# Patient Record
Sex: MALE | Race: WHITE | NOT HISPANIC OR LATINO | Employment: FULL TIME | URBAN - METROPOLITAN AREA
[De-identification: names, ages, dates, MRNs, and addresses within clinical notes are randomized per-mention and may not be internally consistent; named-entity substitution may affect disease eponyms.]

---

## 2017-03-30 ENCOUNTER — OFFICE VISIT (OUTPATIENT)
Dept: URGENT CARE | Facility: CLINIC | Age: 22
End: 2017-03-30
Payer: COMMERCIAL

## 2017-03-30 VITALS
HEIGHT: 68 IN | RESPIRATION RATE: 16 BRPM | SYSTOLIC BLOOD PRESSURE: 118 MMHG | OXYGEN SATURATION: 97 % | HEART RATE: 74 BPM | WEIGHT: 166 LBS | DIASTOLIC BLOOD PRESSURE: 76 MMHG | TEMPERATURE: 99 F | BODY MASS INDEX: 25.16 KG/M2

## 2017-03-30 DIAGNOSIS — H10.33 ACUTE BACTERIAL CONJUNCTIVITIS OF BOTH EYES: ICD-10-CM

## 2017-03-30 PROCEDURE — 99214 OFFICE O/P EST MOD 30 MIN: CPT | Performed by: PHYSICIAN ASSISTANT

## 2017-03-30 RX ORDER — POLYMYXIN B SULFATE AND TRIMETHOPRIM 1; 10000 MG/ML; [USP'U]/ML
1 SOLUTION OPHTHALMIC EVERY 4 HOURS
Qty: 10 ML | Refills: 0 | Status: SHIPPED | OUTPATIENT
Start: 2017-03-30 | End: 2017-04-09

## 2017-03-30 RX ORDER — ERYTHROMYCIN 5 MG/G
OINTMENT OPHTHALMIC
Qty: 1 TUBE | Refills: 0 | Status: SHIPPED | OUTPATIENT
Start: 2017-03-30

## 2017-03-30 ASSESSMENT — ENCOUNTER SYMPTOMS
EYE PAIN: 0
VOMITING: 0
WHEEZING: 0
SPUTUM PRODUCTION: 0
DIARRHEA: 0
PHOTOPHOBIA: 0
SHORTNESS OF BREATH: 0
SORE THROAT: 1
DOUBLE VISION: 0
ABDOMINAL PAIN: 0
BLURRED VISION: 0
CHILLS: 0
EYE DISCHARGE: 1
EYE REDNESS: 1
NAUSEA: 0
FEVER: 0
COUGH: 1

## 2017-03-30 NOTE — Clinical Note
March 30, 2017       Patient: Fanny Delcid   YOB: 1995   Date of Visit: 3/30/2017         To Whom It May Concern:    It is my medical opinion that Fanny Delcid should be excused from work & school for today, tomorrow and Saturday due to illness .    If you have any questions or concerns, please don't hesitate to call [phone]          Sincerely,          Satish Holden PA-C  Electronically Signed

## 2017-03-30 NOTE — PROGRESS NOTES
Subjective:      Fanny Delcid is a 21 y.o. male who presents with Nasal Congestion            Other  Associated symptoms include congestion, coughing and a sore throat ( very mild, in am). Pertinent negatives include no abdominal pain, chills, fever, nausea, rash or vomiting.     Last few days of redness of bilat eyes, notes discharge from eyes, crusting over in am, mild cough, mild sorethroat, denies ear pain, c/o sinus pressure, denies chest congestion. Denies visual changes. Denies nausea/vmoiting/abdpain/diarrhea/rash. PMH of seasonal allerg - tried zyrtec, allegra D. Denies pain to eyes, c/o itch. GF had eye drops from pink eye w/ some relief.   Review of Systems   Constitutional: Negative for fever and chills.   HENT: Positive for congestion and sore throat ( very mild, in am). Negative for ear pain.    Eyes: Positive for discharge and redness. Negative for blurred vision, double vision, photophobia and pain.   Respiratory: Positive for cough. Negative for sputum production, shortness of breath and wheezing.    Gastrointestinal: Negative for nausea, vomiting, abdominal pain and diarrhea.   Skin: Negative for rash.   Endo/Heme/Allergies: Positive for environmental allergies.       PMH:  has no past medical history on file.  MEDS:   Current outpatient prescriptions:   •  polymixin-trimethoprim (POLYTRIM) 13240-0.1 UNIT/ML-% Solution, Place 1 Drop in both eyes every 4 hours for 10 days., Disp: 10 mL, Rfl: 0  •  Pseudoephedrine-APAP-DM (DAYQUIL PO), Take  by mouth., Disp: , Rfl:   •  ibuprofen (MOTRIN) 400 MG Tab, Take 400 mg by mouth every 6 hours as needed., Disp: , Rfl:   •  azithromycin (ZITHROMAX) 250 MG Tab, z-diomedes; U.D., Disp: 6 Tab, Rfl: 1  ALLERGIES:   Allergies   Allergen Reactions   • Penicillins      SURGHX: No past surgical history on file.  SOCHX:  reports that he has never smoked. He has never used smokeless tobacco. He reports that he drinks about 1.2 oz of alcohol per week. He reports that  "he does not use illicit drugs.  FH: Family history was reviewed, no pertinent findings to report    I have worn a mask for the entire encounter with this patient.      Objective:     /76 mmHg  Pulse 74  Temp(Src) 37.2 °C (99 °F)  Resp 16  Ht 1.727 m (5' 8\")  Wt 75.297 kg (166 lb)  BMI 25.25 kg/m2  SpO2 97%     Physical Exam   Constitutional: He is oriented to person, place, and time. He appears well-developed and well-nourished. No distress.   HENT:   Head: Normocephalic and atraumatic.   Right Ear: External ear normal.   Left Ear: External ear normal.   Nose: Nose normal.   Mouth/Throat: No oropharyngeal exudate.   Eyes: EOM and lids are normal. Pupils are equal, round, and reactive to light. Right eye exhibits exudate. Right eye exhibits no chemosis, no discharge and no hordeolum. No foreign body present in the right eye. Left eye exhibits exudate. Left eye exhibits no chemosis, no discharge and no hordeolum. No foreign body present in the left eye. Right conjunctiva is injected. Right conjunctiva has no hemorrhage. Left conjunctiva is injected. Left conjunctiva has no hemorrhage. No scleral icterus.   Neck: Neck supple.   Pulmonary/Chest: Effort normal and breath sounds normal. No respiratory distress. He has no decreased breath sounds. He has no wheezes. He has no rhonchi. He has no rales.   Musculoskeletal: Normal range of motion.   Lymphadenopathy:     He has cervical adenopathy.   Neurological: He is alert and oriented to person, place, and time. Coordination normal.   Skin: Skin is warm and dry. He is not diaphoretic. No pallor.   Psychiatric: He has a normal mood and affect.   Nursing note and vitals reviewed.              Assessment/Plan:   1. Acute bacterial conjunctivitis of both eyes  Supportive care is reviewed with patient/caregiver - recommend to push PO fluids and electrolytes, Nsaids/tylenol, netti pot/saline irrig, humidifier in home, flonase, ponaris, antihistamines    Full course of " eye drops abx for conjunctivitis, discuss risk of contagion and typical course and red flags  Return to clinic with lack of resolution or progression of symptoms.  ER precautions with any worsening symptoms are reviewed with patient/caregiver and they do express understanding  - polymixin-trimethoprim (POLYTRIM) 38268-4.1 UNIT/ML-% Solution; Place 1 Drop in both eyes every 4 hours for 10 days.  Dispense: 10 mL; Refill: 0

## 2017-03-30 NOTE — MR AVS SNAPSHOT
"        Fanny Delcid   3/30/2017 10:40 AM   Office Visit   MRN: [id number]    Department:  [facility]   Dept Phone:  [phone]    Description:  Male : 1995   Provider:  Satish Holden PA-C           Reason for Visit     Nasal Congestion x since yesterday having sinus congestion and drainage, head pressure, discharge from L eye, irritated throat from drainage and cough       Allergies as of 3/30/2017     Allergen Noted Reactions    Penicillins 2016         You were diagnosed with     Acute bacterial conjunctivitis of both eyes   [[id number]]         Vital Signs     Blood Pressure Pulse Temperature Respirations Height Weight    118/76 mmHg 74 37.2 °C (99 °F) 16 1.727 m (5' 8\") 75.297 kg (166 lb)    Body Mass Index Oxygen Saturation Smoking Status             25.25 kg/m2 97% Never Smoker          Basic Information     Date Of Birth Sex Race Ethnicity Preferred Language    1995 Male White Non- English      Health Maintenance        Date Due Completion Dates    IMM HEP B VACCINE (1 of 3 - Primary Series) 1994 ---    IMM HEP A VACCINE (1 of 2 - Standard Series) 1995 ---    IMM HPV VACCINE (1 of 3 - Male 3 Dose Series) 2005 ---    IMM VARICELLA (CHICKENPOX) VACCINE (1 of 2 - 2 Dose Adolescent Series) 2007 ---    IMM DTaP/Tdap/Td Vaccine (1 - Tdap) 2013 ---    IMM INFLUENZA (1) 2016 ---            Current Immunizations     No immunizations on file.      Below and/or attached are the medications your provider expects you to take. Review all of your home medications and newly ordered medications with your provider and/or pharmacist. Follow medication instructions as directed by your provider and/or pharmacist. Please keep your medication list with you and share with your provider. Update the information when medications are discontinued, doses are changed, or new medications (including over-the-counter products) are added; and carry medication " information at all times in the event of emergency situations     Allergies:  PENICILLINS - (reactions not documented)               Medications  Valid as of: March 30, 2017 - 11:03 AM    Generic Name Brand Name Tablet Size Instructions for use    Azithromycin (Tab) ZITHROMAX 250 MG z-diomedes; U.D.        Ibuprofen (Tab) MOTRIN 400 MG Take 400 mg by mouth every 6 hours as needed.        Polymyxin B-Trimethoprim (Solution) POLYTRIM 83087-0.1 UNIT/ML-% Place 1 Drop in both eyes every 4 hours for 10 days.        Pseudoephedrine-APAP-DM   Take  by mouth.        .                 Medicines prescribed today were sent to:     [id number] - [place] - [address]    [address] [place] [address]    Phone: [phone] Fax: [phone]    Open 24 Hours?: No      Medication refill instructions:       If your prescription bottle indicates you have medication refills left, it is not necessary to call your provider’s office. Please contact your pharmacy and they will refill your medication.    If your prescription bottle indicates you do not have any refills left, you may request refills at any time through one of the following ways: The online [employer] system (except Urgent Care), by calling your provider’s office, or by asking your pharmacy to contact your provider’s office with a refill request. Medication refills are processed only during regular business hours and may not be available until the next business day. Your provider may request additional information or to have a follow-up visit with you prior to refilling your medication.   *Please Note: Medication refills are assigned a new Rx number when refilled electronically. Your pharmacy may indicate that no refills were authorized even though a new prescription for the same medication is available at the pharmacy. Please request the medicine by name with the pharmacy before contacting your provider for a refill.           [employer] Access Code: [email or web address]  Expires:  4/29/2017 11:03 AM    [employer]  A secure, online tool to manage your health information     [employer]’s NewACT® is a secure, online tool that connects you to your personalized health information from the privacy of your home -- day or night - making it very easy for you to manage your healthcare. Once the activation process is completed, you can even access your medical information using the NewACT zofia, which is available for free in the Apple Zofia store or Google Play store.     NewACT provides the following levels of access (as shown below):   My Chart Features   [facility] Doctor [facility]  Specialists [facility]  Urgent  Care Non-[facility]  Primary Care  Doctor   Email your healthcare team securely and privately 24/7 X X X    Manage appointments: schedule your next appointment; view details of past/upcoming appointments X      Request prescription refills. X      View recent personal medical records, including lab and immunizations X X X X   View health record, including health history, allergies, medications X X X X   Read reports about your outpatient visits, procedures, consult and ER notes X X X X   See your discharge summary, which is a recap of your hospital and/or ER visit that includes your diagnosis, lab results, and care plan. X X       How to register for NewACT:  1. Go to  [email or web address].  2. Click on the Sign Up Now box, which takes you to the New Member Sign Up page. You will need to provide the following information:  a. Enter your NewACT Access Code exactly as it appears at the top of this page. (You will not need to use this code after you’ve completed the sign-up process. If you do not sign up before the expiration date, you must request a new code.)   b. Enter your date of birth.   c. Enter your home email address.   d. Click Submit, and follow the next screen’s instructions.  3. Create a NewACT ID. This will be your NewACT login ID and cannot be changed, so think of one  that is secure and easy to remember.  4. Create a [employer] password. You can change your password at any time.  5. Enter your Password Reset Question and Answer. This can be used at a later time if you forget your password.   6. Enter your e-mail address. This allows you to receive e-mail notifications when new information is available in [employer].  7. Click Sign Up. You can now view your health information.    For assistance activating your [employer] account, call [phone]

## 2018-06-04 ENCOUNTER — OCCUPATIONAL MEDICINE (OUTPATIENT)
Dept: URGENT CARE | Facility: CLINIC | Age: 23
End: 2018-06-04
Payer: COMMERCIAL

## 2018-06-04 VITALS
OXYGEN SATURATION: 98 % | TEMPERATURE: 99 F | WEIGHT: 172.4 LBS | BODY MASS INDEX: 26.13 KG/M2 | HEART RATE: 74 BPM | SYSTOLIC BLOOD PRESSURE: 110 MMHG | DIASTOLIC BLOOD PRESSURE: 80 MMHG | HEIGHT: 68 IN

## 2018-06-04 DIAGNOSIS — S46.912A LEFT SHOULDER STRAIN, INITIAL ENCOUNTER: ICD-10-CM

## 2018-06-04 PROCEDURE — 99213 OFFICE O/P EST LOW 20 MIN: CPT | Mod: 29 | Performed by: NURSE PRACTITIONER

## 2018-06-04 NOTE — PROGRESS NOTES
"Subjective:      Fanny Delcid is a 22 y.o. male who presents with Shoulder Injury (WC L shoulder pain,was ;ifting a heavy table at work now shoulder feels tight\")      DOI: 5/24/18. Patient is 22 year old male with left shoulder injury after lifting a heavy table at work. Since that time injury has worsened. He has 3/10 pain at rest and 8/10 pain when active. He is right hand dominant. No distal paresthesia. He has both pain and tightness. He has history of broken clavicle on affected side 2 years ago. He has been taking aleve intermittently. He denies any second job.     HPI    ROS       Objective:     /80   Pulse 74   Temp 37.2 °C (99 °F)   Ht 1.727 m (5' 8\")   Wt 78.2 kg (172 lb 6.4 oz)   SpO2 98%   BMI 26.21 kg/m²      Physical Exam   Constitutional: He is oriented to person, place, and time. He appears well-developed and well-nourished.   Cardiovascular: Normal rate, regular rhythm and normal heart sounds.    No murmur heard.  Pulmonary/Chest: Effort normal and breath sounds normal.   Musculoskeletal: Normal range of motion.        Left shoulder: He exhibits tenderness and pain. He exhibits normal range of motion, no spasm and normal strength.        Arms:  Neurological: He is alert and oriented to person, place, and time.   Skin: Skin is warm and dry.   Psychiatric: He has a normal mood and affect. His behavior is normal. Thought content normal.   Nursing note and vitals reviewed.              Assessment/Plan:     1. Left shoulder strain, initial encounter       Dedicated course of nsaids.  Icing to the area.  Exercise handout via AVS  Follow up Friday.      "

## 2018-06-04 NOTE — LETTER
[facility]  [address]  [place] [address]  Phone:  [phone] - Fax:  [phone]   [facility] Progress Report and Disability Certification  Date of Service: 6/4/2018   No Show:  No  Date / Time of Next Visit:     Claim Information   Patient Name: Fanny Delcid  Claim Number:     Employer:  [employer] Date of Injury: 5/24/2018     Insurer / TPA: Employers Insurance  ID / SSN:     Occupation: /   Diagnosis: The encounter diagnosis was Left shoulder strain, initial encounter.    Medical Information   Related to Industrial Injury? Yes    Subjective Complaints:  DOI: 5/24/18. Patient is 22 year old male with left shoulder injury after lifting a heavy table at work. Since that time injury has worsened. He has 3/10 pain at rest and 8/10 pain when active. He is right hand dominant. No distal paresthesia. He has both pain and tightness. He has history of broken clavicle on affected side 2 years ago. He has been taking aleve intermittently. He denies any second job.   Objective Findings: Physical Exam   Constitutional: He is oriented to person, place, and time. He appears well-developed and well-nourished.   Cardiovascular: Normal rate, regular rhythm and normal heart sounds.    No murmur heard.  Pulmonary/Chest: Effort normal and breath sounds normal.   Musculoskeletal: Normal range of motion.        Left shoulder: He exhibits tenderness and pain. He exhibits normal range of motion, no spasm and normal strength.   Neurological: He is alert and oriented to person, place, and time.   Skin: Skin is warm and dry.   Psychiatric: He has a normal mood and affect. His behavior is normal. Thought content normal.   Nursing note and vitals reviewed.     Pre-Existing Condition(s):     Assessment:   Initial Visit    Status: Additional Care Required  Permanent Disability:No    Plan:      Diagnostics:      Comments:          Disability Information   Status: Released to Restricted Duty    From:     Through:   Restrictions are:     Physical Restrictions   Sitting:    Standing:    Stooping:    Bending:      Squatting:    Walking:    Climbing:    Pushing:      Pulling:    Other:    Reaching Above Shoulder (L): < or = to 4 hrs/day Reaching Above Shoulder (R):       Reaching Below Shoulder (L):    Reaching Below Shoulder (R):      Not to exceed Weight Limits   Carrying(hrs):   Weight Limit(lb): < or = to 10 pounds Lifting(hrs):   Weight  Limit(lb): < or = to 10 pounds   Comments: Follow up on Friday.    Repetitive Actions   Hands: i.e. Fine Manipulations from Grasping:     Feet: i.e. Operating Foot Controls:     Driving / Operate Machinery:     Physician Name: ADA Dudley Physician Signature: AMALIA Wood e-Signature: Dr. Zoran Livingston, Medical Director   Clinic Name / Location: [facility]  [address] [place] [id number] Clinic Phone Number: Dept: [phone]   Appointment Time: 12:15 Pm Visit Start Time: 12:34 PM   Check-In Time:  12:29 Pm Visit Discharge Time: 12:54 PM   Original-Treating Physician or Chiropractor    Page 2-Insurer/TPA    Page 3-Employer    Page 4-Employee

## 2018-06-04 NOTE — PATIENT INSTRUCTIONS
Shoulder Exercises  Ask your health care provider which exercises are safe for you. Do exercises exactly as told by your health care provider and adjust them as directed. It is normal to feel mild stretching, pulling, tightness, or discomfort as you do these exercises, but you should stop right away if you feel sudden pain or your pain gets worse. Do not begin these exercises until told by your health care provider.  RANGE OF MOTION EXERCISES   These exercises warm up your muscles and joints and improve the movement and flexibility of your shoulder. These exercises also help to relieve pain, numbness, and tingling. These exercises involve stretching your injured shoulder directly.  Exercise A: Pendulum   1. Stand near a wall or a surface that you can hold onto for balance.  2. Bend at the waist and let your left / right arm hang straight down. Use your other arm to support you. Keep your back straight and do not lock your knees.  3. Relax your left / right arm and shoulder muscles, and move your hips and your trunk so your left / right arm swings freely. Your arm should swing because of the motion of your body, not because you are using your arm or shoulder muscles.  4. Keep moving your body so your arm swings in the following directions, as told by your health care provider:  ¨ Side to side.  ¨ Forward and backward.  ¨ In clockwise and counterclockwise circles.  5. Continue each motion for __________ seconds, or for as long as told by your health care provider.  6. Slowly return to the starting position.  Repeat __________ times. Complete this exercise __________ times a day.  Exercise B: Flexion, Standing   1. Stand and hold a broomstick, a cane, or a similar object. Place your hands a little more than shoulder-width apart on the object. Your left / right hand should be palm-up, and your other hand should be palm-down.  2. Keep your elbow straight and keep your shoulder muscles relaxed. Push the stick down with  your healthy arm to raise your left / right arm in front of your body, and then over your head until you feel a stretch in your shoulder.  ¨ Avoid shrugging your shoulder while you raise your arm. Keep your shoulder blade tucked down toward the middle of your back.  3. Hold for __________ seconds.  4. Slowly return to the starting position.  Repeat __________ times. Complete this exercise __________ times a day.  Exercise C: Abduction, Standing  1. Stand and hold a broomstick, a cane, or a similar object. Place your hands a little more than shoulder-width apart on the object. Your left / right hand should be palm-up, and your other hand should be palm-down.  2. While keeping your elbow straight and your shoulder muscles relaxed, push the stick across your body toward your left / right side. Raise your left / right arm to the side of your body and then over your head until you feel a stretch in your shoulder.  ¨ Do not raise your arm above shoulder height, unless your health care provider tells you to do that.  ¨ Avoid shrugging your shoulder while you raise your arm. Keep your shoulder blade tucked down toward the middle of your back.  3. Hold for __________ seconds.  4. Slowly return to the starting position.  Repeat __________ times. Complete this exercise __________ times a day.  Exercise D: Internal Rotation   1. Place your left / right hand behind your back, palm-up.  2. Use your other hand to dangle an exercise band, a towel, or a similar object over your shoulder. Grasp the band with your left / right hand so you are holding onto both ends.  3. Gently pull up on the band until you feel a stretch in the front of your left / right shoulder.  ¨ Avoid shrugging your shoulder while you raise your arm. Keep your shoulder blade tucked down toward the middle of your back.  4. Hold for __________ seconds.  5. Release the stretch by letting go of the band and lowering your hands.  Repeat __________ times. Complete this  exercise __________ times a day.  STRETCHING EXERCISES   These exercises warm up your muscles and joints and improve the movement and flexibility of your shoulder. These exercises also help to relieve pain, numbness, and tingling. These exercises are done using your healthy shoulder to help stretch the muscles of your injured shoulder.  Exercise E: Corner Stretch (External Rotation and Abduction)   1.  a doorway with one of your feet slightly in front of the other. This is called a staggered stance. If you cannot reach your forearms to the door frame, stand facing a corner of a room.  2. Choose one of the following positions as told by your health care provider:  ¨ Place your hands and forearms on the door frame above your head.  ¨ Place your hands and forearms on the door frame at the height of your head.  ¨ Place your hands on the door frame at the height of your elbows.  3. Slowly move your weight onto your front foot until you feel a stretch across your chest and in the front of your shoulders. Keep your head and chest upright and keep your abdominal muscles tight.  4. Hold for __________ seconds.  5. To release the stretch, shift your weight to your back foot.  Repeat __________ times. Complete this stretch __________ times a day.  Exercise F: Extension, Standing  1. Stand and hold a broomstick, a cane, or a similar object behind your back.  ¨ Your hands should be a little wider than shoulder-width apart.  ¨ Your palms should face away from your back.  2. Keeping your elbows straight and keeping your shoulder muscles relaxed, move the stick away from your body until you feel a stretch in your shoulder.  ¨ Avoid shrugging your shoulders while you move the stick. Keep your shoulder blade tucked down toward the middle of your back.  3. Hold for __________ seconds.  4. Slowly return to the starting position.  Repeat __________ times. Complete this exercise __________ times a day.  STRENGTHENING EXERCISES      These exercises build strength and endurance in your shoulder. Endurance is the ability to use your muscles for a long time, even after they get tired.  Exercise G: External Rotation   1. Sit in a stable chair without armrests.  2. Secure an exercise band at elbow height on your left / right side.  3. Place a soft object, such as a folded towel or a small pillow, between your left / right upper arm and your body to move your elbow a few inches away (about 10 cm) from your side.  4. Hold the end of the band so it is tight and there is no slack.  5. Keeping your elbow pressed against the soft object, move your left / right forearm out, away from your abdomen. Keep your body steady so only your forearm moves.  6. Hold for __________ seconds.  7. Slowly return to the starting position.  Repeat __________ times. Complete this exercise __________ times a day.  Exercise H: Shoulder Abduction   1. Sit in a stable chair without armrests, or stand.  2. Hold a __________ weight in your left / right hand, or hold an exercise band with both hands.  3. Start with your arms straight down and your left / right palm facing in, toward your body.  4. Slowly lift your left / right hand out to your side. Do not lift your hand above shoulder height unless your health care provider tells you that this is safe.  ¨ Keep your arms straight.  ¨ Avoid shrugging your shoulder while you do this movement. Keep your shoulder blade tucked down toward the middle of your back.  5. Hold for __________ seconds.  6. Slowly lower your arm, and return to the starting position.  Repeat __________ times. Complete this exercise __________ times a day.  Exercise I: Shoulder Extension  1. Sit in a stable chair without armrests, or stand.  2. Secure an exercise band to a stable object in front of you where it is at shoulder height.  3. Hold one end of the exercise band in each hand. Your palms should face each other.  4. Straighten your elbows and lift your  "hands up to shoulder height.  5. Step back, away from the secured end of the exercise band, until the band is tight and there is no slack.  6. Squeeze your shoulder blades together as you pull your hands down to the sides of your thighs. Stop when your hands are straight down by your sides. Do not let your hands go behind your body.  7. Hold for __________ seconds.  8. Slowly return to the starting position.  Repeat __________ times. Complete this exercise __________ times a day.  Exercise J: Standing Shoulder Row  1. Sit in a stable chair without armrests, or stand.  2. Secure an exercise band to a stable object in front of you so it is at waist height.  3. Hold one end of the exercise band in each hand. Your palms should be in a thumbs-up position.  4. Bend each of your elbows to an \"L\" shape (about 90 degrees) and keep your upper arms at your sides.  5. Step back until the band is tight and there is no slack.  6. Slowly pull your elbows back behind you.  7. Hold for __________ seconds.  8. Slowly return to the starting position.  Repeat __________ times. Complete this exercise __________ times a day.  Exercise K: Shoulder Press-Ups   1. Sit in a stable chair that has armrests. Sit upright, with your feet flat on the floor.  2. Put your hands on the armrests so your elbows are bent and your fingers are pointing forward. Your hands should be about even with the sides of your body.  3. Push down on the armrests and use your arms to lift yourself off of the chair. Straighten your elbows and lift yourself up as much as you comfortably can.  ¨ Move your shoulder blades down, and avoid letting your shoulders move up toward your ears.  ¨ Keep your feet on the ground. As you get stronger, your feet should support less of your body weight as you lift yourself up.  4. Hold for __________ seconds.  5. Slowly lower yourself back into the chair.  Repeat __________ times. Complete this exercise __________ times a day.  Exercise " L: Wall Push-Ups   1. Stand so you are facing a stable wall. Your feet should be about one arm-length away from the wall.  2. Lean forward and place your palms on the wall at shoulder height.  3. Keep your feet flat on the floor as you bend your elbows and lean forward toward the wall.  4. Hold for __________ seconds.  5. Straighten your elbows to push yourself back to the starting position.  Repeat __________ times. Complete this exercise __________ times a day.  This information is not intended to replace advice given to you by your health care provider. Make sure you discuss any questions you have with your health care provider.  Document Released: 11/01/2006 Document Revised: 09/11/2017 Document Reviewed: 08/28/2016  Elsevier Interactive Patient Education © 2017 [employer]  n2

## 2018-06-04 NOTE — LETTER
"EMPLOYEE’S CLAIM FOR COMPENSATION/ REPORT OF INITIAL TREATMENT  FORM C-4    EMPLOYEE’S CLAIM - PROVIDE ALL INFORMATION REQUESTED   First Name  Fanny Last Name  Farhana Birthdate                    1995                Sex  male Claim Number   Home Address  [address] Age  22 y.o. Height  1.727 m (5' 8\") Weight  78.2 kg (172 lb 6.4 oz) [facility]     [facility] [place]  [address] Telephone  [phone] (home)    Mailing Address  [address] [facility] [place]  [address] Primary Language Spoken  English    Insurer   Third Party   Employers Insurance   Employee's Occupation (Job Title) When Injury or Occupational Disease Occurred  /     Employer's Name     Telephone  [phone]    Employer Address    City    State    Zip      Date of Injury  5/24/2018               Hour of Injury  11:30 AM Date Employer Notified  5/30/2018 Last Day of Work after Injury or Occupational Disease  6/4/2018 Supervisor to Whom Injury Reported  Radha Bee Mr. Card   Address or Location of Accident (if applicable)  [On delivery at  [employer] and store]   What were you doing at the time of accident? (if applicable)  Lifting table, driving forklift. lifting cahir    How did this injury or occupational disease occur? (Be specific an answer in detail. Use additional sheet if necessary)  at work, lifting   If you believe that you have an occupational disease, when did you first have knowledge of the disability and it relationship to your employment?  N/A Witnesses to the Accident  Jeramy E      Nature of Injury or Occupational Disease  Workers' Compensation  Part(s) of Body Injured or Affected  Shoulder (L), ,     I certify that the above is true and correct to the best of my knowledge and that I have provided this information in order to obtain the benefits of [place]’s [place] Insurance and " [place]  I hereby authorize any physician, chiropractor, surgeon, practitioner, or other person, any hospital, including [facility] or [facility], any medical service organization, any insurance company, or other institution or organization to release to each other, any medical or other information, including benefits paid or payable, pertinent to this injury or disease, except information relative to diagnosis, treatment and/or counseling for AIDS, psychological conditions, alcohol or controlled substances, for which I must give specific authorization.  A Photostat of this authorization shall be as valid as the original.     Date 6/4/2018   [facility]   Employee’s Signature   THIS REPORT MUST BE COMPLETED AND MAILED WITHIN 3 WORKING DAYS OF TREATMENT   Place  [facility]  Name of Facility  [facility]   Date  6/4/2018 Diagnosis  (S46.912A) Left shoulder strain, initial encounter Is there evidence the injured employee was under the influence of alcohol and/or another controlled substance at the time of accident?   Hour  12:34 PM Description of Injury or Disease  The encounter diagnosis was Left shoulder strain, initial encounter. No   Treatment  nsaids and icing.  Have you advised the patient to remain off work five days or more? No   X-Ray Findings      If Yes   From Date  To Date      From information given by the employee, together with medical evidence, can you directly connect this injury or occupational disease as job incurred?  Yes If No Full Duty  No Modified Duty  Yes   Is additional medical care by a physician indicated?  Yes If Modified Duty, Specify any Limitations / Restrictions  10 weight carry limit. No raising left arm above shoulder.   Do you know of any previous injury or disease contributing to this condition or occupational disease?                            No  "  Date  6/4/2018 Print Doctor’s Name ADA Dudley I certify the employer’s copy of  this form was mailed on:   Address  [address] Insurer’s Use Only     [place] Zip  [address]    Provider’s Tax ID Number  [id number] Telephone  Dept: [phone]        e-AMALIA Aguiar   e-Signature: Dr. Zoran Livingston, Medical Director Degree  APN        ORIGINAL-TREATING PHYSICIAN OR CHIROPRACTOR    PAGE 2-INSURER/TPA    PAGE 3-EMPLOYER    PAGE 4-EMPLOYEE             Form C-4 (rev10/07)              BRIEF DESCRIPTION OF RIGHTS AND BENEFITS  (Pursuant to [place] 616[place])    Notice of Injury or Occupational Disease (Incident Report Form C-1): If an injury or occupational disease (OD) arises out of and in the  course of employment, you must provide written notice to your employer as soon as practicable, but no later than 7 days after the accident or  OD. Your employer shall maintain a sufficient supply of the required forms.    Claim for Compensation (Form C-4): If medical treatment is sought, the form C-4 is available at the place of initial treatment. A completed  \"Claim for Compensation\" (Form C-4) must be filed within 90 days after an accident or OD. The treating physician or chiropractor must,  within 3 working days after treatment, complete and mail to the employer, the employer's insurer and third-party , the Claim for  Compensation.    Medical Treatment: If you require medical treatment for your on-the-job injury or OD, you may be required to select a physician or  chiropractor from a list provided by your workers’ compensation insurer, if it has contracted with an Organization for Managed Care (MCO) or  Preferred Provider Organization (PPO) or providers of health care. If your employer has not entered into a contract with an MCO or PPO, you  may select a physician or chiropractor from the Panel of Physicians and Chiropractors. Any medical costs related to your " industrial injury or  OD will be paid by your insurer.    Temporary Total Disability (TTD): If your doctor has certified that you are unable to work for a period of at least 5 consecutive days, or 5  cumulative days in a 20-day period, or places restrictions on you that your employer does not accommodate, you may be entitled to TTD  compensation.    Temporary Partial Disability (TPD): If the wage you receive upon reemployment is less than the compensation for TTD to which you are  entitled, the insurer may be required to pay you TPD compensation to make up the difference. TPD can only be paid for a maximum of 24  months.    Permanent Partial Disability (PPD): When your medical condition is stable and there is an indication of a PPD as a result of your injury or  OD, within 30 days, your insurer must arrange for an evaluation by a rating physician or chiropractor to determine the degree of your PPD. The  amount of your PPD award depends on the date of injury, the results of the PPD evaluation and your age and wage.    Permanent Total Disability (PTD): If you are medically certified by a treating physician or chiropractor as permanently and totally disabled  and have been granted a PTD status by your insurer, you are entitled to receive monthly benefits not to exceed 66 2/3% of your average  monthly wage. The amount of your PTD payments is subject to reduction if you previously received a PPD award.    Vocational Rehabilitation Services: You may be eligible for vocational rehabilitation services if you are unable to return to the job due to a  permanent physical impairment or permanent restrictions as a result of your injury or occupational disease.    Transportation and Per Dorothy Reimbursement: You may be eligible for travel expenses and per dorothy associated with medical treatment.    Reopening: You may be able to reopen your claim if your condition worsens after claim closure.    Appeal Process: If you disagree with a  written determination issued by the insurer or the insurer does not respond to your request, you may  appeal to the Department of Administration, , by following the instructions contained in your determination letter. You must  appeal the determination within 70 days from the date of the determination letter at [address], [address], [place], [place]  [address], or [address], [address], [place], [place] [address]. If you disagree with the  decision, you may appeal to the  Department of Administration, . You must file your appeal within 30 days from the date of the  decision  letter at [address], [address], [place], [place] [address], or [address], [address] [facility] [address], [place], [place] [address]. If you  disagree with a decision of an , you may file a petition for judicial review with the District Court. You must do so within 30  days of the Appeal Officer’s decision. You may be represented by an  at your own expense or you may contact the [facility] for possible  representation.    [place]  for Injured Workers (NAIW): If you disagree with a  decision, you may request that NAIW represent you  without charge at an  Hearing. For information regarding denial of benefits, you may contact the [facility] at: [address], [address], [place] [address], [phone], or [address], [address], [place] [address], [phone]    To File a Complaint with the Division: If you wish to file a complaint with the  of the Division of Industrial Relations (DIR),  please contact the Workers’ Compensation Section, [address], [address], [place], [place] [address], telephone [phone]) [phone], or  [address], [address], [place], [place] [address], telephone [phone].    For assistance with Workers’ Compensation Issues: you may  contact the Office of the Governor Consumer Health Assistance, [address] [place] [address], [address], [place], [place] [address], Toll Free 1-418.876.2825, Web site: http://govcha.[facility].nv.us, E-mail  [email or web address].[email or web address].                                                                                                                                                                                                                                   __________________________________________________________________                                                                   _________________                Employee Name / Signature                                                                                                                                                       Date                                                                                                                                                                                                     D-2 (rev. 10/07)

## 2018-06-08 ENCOUNTER — OCCUPATIONAL MEDICINE (OUTPATIENT)
Dept: URGENT CARE | Facility: CLINIC | Age: 23
End: 2018-06-08
Payer: COMMERCIAL

## 2018-06-08 VITALS
RESPIRATION RATE: 16 BRPM | TEMPERATURE: 98 F | WEIGHT: 174.16 LBS | SYSTOLIC BLOOD PRESSURE: 110 MMHG | DIASTOLIC BLOOD PRESSURE: 80 MMHG | BODY MASS INDEX: 26.4 KG/M2 | HEIGHT: 68 IN | HEART RATE: 68 BPM | OXYGEN SATURATION: 98 %

## 2018-06-08 DIAGNOSIS — S46.912D STRAIN OF LEFT SHOULDER, SUBSEQUENT ENCOUNTER: ICD-10-CM

## 2018-06-08 PROCEDURE — 99213 OFFICE O/P EST LOW 20 MIN: CPT | Mod: 29 | Performed by: PHYSICIAN ASSISTANT

## 2018-06-08 NOTE — LETTER
[facility]  [address] [place] [address]  Phone:  [phone] - Fax:  [phone]   [facility] Progress Report and Disability Certification  Date of Service: 6/8/2018   No Show:  No  Date / Time of Next Visit: 6/15/2018   Claim Information   Patient Name: Fanny Delcid  Claim Number:     Employer:   [employer] Date of Injury: 5/24/2018     Insurer / TPA: Employers Insurance  ID / SSN:     Occupation: /   Diagnosis: The encounter diagnosis was Strain of left shoulder, subsequent encounter.    Medical Information   Related to Industrial Injury? Yes    Subjective Complaints:  DOI: 5/24/18. Patient is 22 year old male with left shoulder injury after lifting a heavy table at work. Pt describes improved pain over interim with restrictions. He states he is now approximately 95% better with pain nearly completely resolved. He is right hand dominant. No distal paresthesia. He has both pain and tightness. He has history of broken clavicle on affected side 2 years ago. He has been taking aleve intermittently. He denies any second job. . He has been compliant with use of Aleve and icing   Objective Findings: Gen: AOx3; Head: NC AT; Eyes: PERRLA/EOM; Lungs: NLR; Cardiac: RR by periph pulse exam; right shoulder: grossly normal, no effusion, erythema or ecchymosis, full active range of motion, rotator cuff strength 5 out of 5 in all planes, very mild tenderness to palpation over long head of biceps, negative Yergason special tests negative Adson speeds and negative Neer's Special tests; neuro: N VID bilateral upper extremities with normal sensation to light touch and +2rad   Pre-Existing Condition(s):     Assessment:   Condition Improved    Status: Additional Care Required  Permanent Disability:No    Plan:   Comments:continue restricted duty, 25# weight restriction, full active range of motion. Continue over-the-counter  anti-inflammatory stretching and icing. Follow-up in one week      Diagnostics:      Comments:  continue restricted duty, 25# weight restriction, full active range of motion. Continue over-the-counter anti-inflammatory stretching and icing. Follow-up in one week     Disability Information   Status: Released to Restricted Duty    From:  6/8/2018  Through: 6/15/2018 Restrictions are: Temporary   Physical Restrictions   Sitting:    Standing:    Stooping:    Bending:      Squatting:    Walking:    Climbing:    Pushing:      Pulling:    Other:    Reaching Above Shoulder (L):   Reaching Above Shoulder (R):       Reaching Below Shoulder (L):    Reaching Below Shoulder (R):      Not to exceed Weight Limits   Carrying(hrs):   Weight Limit(lb): < or = to 25 pounds  Comments:LUE Lifting(hrs):   Weight  Limit(lb): < or = to 25 pounds  Comments:LUE   Comments: continue restricted duty, 25# weight restriction, full active range of motion. Continue over-the-counter anti-inflammatory stretching and icing. Follow-up in one week     Repetitive Actions   Hands: i.e. Fine Manipulations from Grasping:     Feet: i.e. Operating Foot Controls:     Driving / Operate Machinery:     Physician Name: Satish Holden P.A.-C. Physician Signature: SATISH Cuadra P.A.-C. e-Signature: Dr. Zoran Livingston, Medical Director   Clinic Name / Location: [address] [address] Clinic Phone Number: Dept: [phone]   Appointment Time: 8:30 Am Visit Start Time: 8:44 AM   Check-In Time:  8:37 Am Visit Discharge Time: 9:07 AM    Original-Treating Physician or Chiropractor    Page 2-Insurer/TPA    Page 3-Employer    Page 4-Employee

## 2018-06-08 NOTE — PROGRESS NOTES
"Subjective:      Fanny Delcid is a 22 y.o. male who presents with Shoulder Pain ( follow up for Lt. shoulder strain)      DOI: 5/24/18. Patient is 22 year old male with left shoulder injury after lifting a heavy table at work. Pt describes improved pain over interim with restrictions. He states he is now approximately 95% better with pain nearly completely resolved. He is right hand dominant. No distal paresthesia. He has both pain and tightness. He has history of broken clavicle on affected side 2 years ago. He has been taking aleve intermittently. He denies any second job. . He has been compliant with use of Aleve and icing     Shoulder Pain         ROS       Objective:     /80   Pulse 68   Temp 36.7 °C (98 °F)   Resp 16   Ht 1.727 m (5' 8\")   Wt 79 kg (174 lb 2.6 oz)   SpO2 98%   BMI 26.48 kg/m²      Physical Exam    Gen: AOx3; Head: NC AT; Eyes: PERRLA/EOM; Lungs: NLR; Cardiac: RR by periph pulse exam; right shoulder: grossly normal, no effusion, erythema or ecchymosis, full active range of motion, rotator cuff strength 5 out of 5 in all planes, very mild tenderness to palpation over long head of biceps, negative Yergason special tests negative Adson speeds and negative Neer's Special tests; neuro: N VID bilateral upper extremities with normal sensation to light touch and +2rad       Assessment/Plan:     1. Strain of left shoulder, subsequent encounter  continue restricted duty, 25# weight restriction, full active range of motion. Continue over-the-counter anti-inflammatory stretching and icing. Follow-up in one week       "

## 2018-06-15 ENCOUNTER — OCCUPATIONAL MEDICINE (OUTPATIENT)
Dept: URGENT CARE | Facility: CLINIC | Age: 23
End: 2018-06-15
Payer: COMMERCIAL

## 2018-06-15 VITALS
OXYGEN SATURATION: 95 % | RESPIRATION RATE: 16 BRPM | HEART RATE: 76 BPM | TEMPERATURE: 98.9 F | SYSTOLIC BLOOD PRESSURE: 122 MMHG | DIASTOLIC BLOOD PRESSURE: 70 MMHG | HEIGHT: 68 IN | BODY MASS INDEX: 26.37 KG/M2 | WEIGHT: 174 LBS

## 2018-06-15 DIAGNOSIS — S46.912D LEFT SHOULDER STRAIN, SUBSEQUENT ENCOUNTER: ICD-10-CM

## 2018-06-15 PROCEDURE — 99213 OFFICE O/P EST LOW 20 MIN: CPT | Mod: 29 | Performed by: NURSE PRACTITIONER

## 2018-06-15 NOTE — LETTER
"   [facility]  [address] [place] [address]  Phone:  [phone] - Fax:  [phone]   [facility] Progress Report and Disability Certification  Date of Service: 6/15/2018   No Show:  No  Date / Time of Next Visit:     Claim Information   Patient Name: Fanny Delcid  Claim Number:     Employer:    Date of Injury: 5/24/2018     Insurer / TPA: Employers Insurance  ID / SSN:     Occupation: /   Diagnosis: The encounter diagnosis was Left shoulder strain, subsequent encounter.    Medical Information   Related to Industrial Injury? Yes    Subjective Complaints:  DOI: 5/24/18. Patient is 22 year old male with left shoulder strain after lifting heavy table. Today he is \"100%\" no pain or tightness. Complying with restrictions. No medications being taken at this time. No second job, previous clavicle fracture.     Objective Findings: Full ROM of shoulder and no tenderness of spasm noted on exam.   Pre-Existing Condition(s):     Assessment:   Condition Improved    Status: Discharged /  MMI  Permanent Disability:No    Plan:      Diagnostics:      Comments:       Disability Information   Status: Released to Full Duty    From:     Through:   Restrictions are:     Physical Restrictions   Sitting:    Standing:    Stooping:    Bending:      Squatting:    Walking:    Climbing:    Pushing:      Pulling:    Other:    Reaching Above Shoulder (L):   Reaching Above Shoulder (R):       Reaching Below Shoulder (L):    Reaching Below Shoulder (R):      Not to exceed Weight Limits   Carrying(hrs):   Weight Limit(lb):   Lifting(hrs):   Weight  Limit(lb):     Comments:      Repetitive Actions   Hands: i.e. Fine Manipulations from Grasping:     Feet: i.e. Operating Foot Controls:     Driving / Operate Machinery:     Physician Name: ADA Dudley Physician Signature: AMALIA Wood e-Signature: Dr. Zoran Livingston, Medical " Director   Clinic Name / Location: [facility]  [address] [place] [address] Clinic Phone Number: Dept: [phone]   Appointment Time: 8:00 Am Visit Start Time: 8:18 AM   Check-In Time:  8:09 Am Visit Discharge Time:  8:38AM   Original-Treating Physician or Chiropractor    Page 2-Insurer/TPA    Page 3-Employer    Page 4-Employee

## 2018-06-15 NOTE — PROGRESS NOTES
"Subjective:      Fanny Delcid is a 22 y.o. male who presents with Work-Related Injury (WC f/v left shoulder injury)      DOI: 5/24/18. Patient is 22 year old male with left shoulder strain after lifting heavy table. Today he is \"100%\" no pain or tightness. Complying with restrictions. No medications being taken at this time. No second job, previous clavicle fracture.       HPI    ROS       Objective:     /70   Pulse 76   Temp 37.2 °C (98.9 °F)   Resp 16   Ht 1.727 m (5' 7.99\")   Wt 78.9 kg (174 lb)   SpO2 95%   BMI 26.46 kg/m²      Physical Exam    Full ROM of shoulder and no tenderness of spasm noted on exam.       Assessment/Plan:     1. Left shoulder strain, subsequent encounter        released MMI.  Follow up as needed.  "